# Patient Record
Sex: FEMALE | Race: WHITE | NOT HISPANIC OR LATINO | Employment: UNEMPLOYED | URBAN - NONMETROPOLITAN AREA
[De-identification: names, ages, dates, MRNs, and addresses within clinical notes are randomized per-mention and may not be internally consistent; named-entity substitution may affect disease eponyms.]

---

## 2024-07-22 PROBLEM — I50.22 CHRONIC SYSTOLIC HEART FAILURE: Status: ACTIVE | Noted: 2024-07-22

## 2024-07-22 PROBLEM — Z72.0 TOBACCO ABUSE: Status: ACTIVE | Noted: 2024-07-22

## 2024-07-22 PROBLEM — I27.20 PULMONARY HYPERTENSION: Status: ACTIVE | Noted: 2024-07-22

## 2024-07-24 ENCOUNTER — TELEPHONE (OUTPATIENT)
Dept: FAMILY MEDICINE CLINIC | Facility: CLINIC | Age: 34
End: 2024-07-24

## 2024-07-24 NOTE — TELEPHONE ENCOUNTER
Caller: ROSARIO WITH [employer]    Relationship:     Best call back number: [phone]     What is the medical concern/diagnosis: NURSING AND PHYSICAL THERAPY    What specialty or service is being requested:  HOME HEALTH    What is the provider, practice or medical service name:  [facility].     What is the office location:      What is the office phone number:  [phone]    Any additional details: PATIENT IS NOT IN THE CORRECT SERVICE AREA. PLEASE CONTACT ROSARIO IF PATIENT MOVES BACK TO THAT SERVICE AREA.       THANKS

## 2024-07-25 NOTE — TELEPHONE ENCOUNTER
HUB TO RELAY MESSAGE BELOW     If someone from [facility] calls, we are trying to reach Che to get documentation regarding her legal name, Krzysztof has her at [facility] and we have her as Frank, insurance states last name of Frank and ID is under [facility] so before orders can be signed by PCP we need documentation reflecting her legal last name.

## 2024-07-29 ENCOUNTER — PATIENT OUTREACH (OUTPATIENT)
Age: 34
End: 2024-07-29
Payer: MEDICAID

## 2024-07-29 NOTE — OUTREACH NOTE
SW received referral via PCP re: financial concerns, food insecurity, transportation, etc.  SW attempted to reach patient. No Answer. Number not in service, at this time. No emergency contact listed. Next outreach scheduled x 2 days.     Radha HARRY -   Ambulatory Case Management    7/29/2024, 13:18 EDT

## 2024-08-08 ENCOUNTER — PATIENT OUTREACH (OUTPATIENT)
Age: 34
End: 2024-08-08
Payer: MEDICAID

## 2024-08-08 NOTE — OUTREACH NOTE
SW received referral via PCP re: financial and food concerns. SW attempted to reach patient x3 with no success. SW to discharge as unable to reach patient. Please re consult SW if additional needs arise.     Radha HARRY -   Ambulatory Case Management    8/8/2024, 11:01 EDT

## 2024-08-13 ENCOUNTER — TELEPHONE (OUTPATIENT)
Dept: FAMILY MEDICINE CLINIC | Facility: CLINIC | Age: 34
End: 2024-08-13
Payer: MEDICAID

## 2024-08-13 DIAGNOSIS — S88.112D BELOW-KNEE AMPUTATION OF LEFT LOWER EXTREMITY, SUBSEQUENT ENCOUNTER: Primary | ICD-10-CM

## 2024-08-13 DIAGNOSIS — Z74.09 MOBILITY IMPAIRED: ICD-10-CM

## 2024-08-13 NOTE — TELEPHONE ENCOUNTER
Caller: [facility]    Relationship: Self    Best call back number: [phone]    Equipment requested: WHEELCHAIR    Reason for the request: PATIENT HAS AMPUTATION    Prescribing Provider: HESHAM MON    Additional information or concerns: PATIENT IS IN NEED OF A WHEELCHAIR.  THE BORROWED CHAIR THAT SHE IS USING THE WHEEL HAS FALLEN OFF AND IS IN NEED OF A NEW CHAIR THROUGH HER INSURANCE.

## 2024-08-14 ENCOUNTER — TELEPHONE (OUTPATIENT)
Dept: FAMILY MEDICINE CLINIC | Facility: CLINIC | Age: 34
End: 2024-08-14

## 2024-08-14 ENCOUNTER — TELEPHONE (OUTPATIENT)
Dept: FAMILY MEDICINE CLINIC | Facility: CLINIC | Age: 34
End: 2024-08-14
Payer: MEDICAID

## 2024-08-14 NOTE — TELEPHONE ENCOUNTER
"  Caller: OXANA BELTRÁN    Best call back number: [phone]    What orders are you requesting (i.e. lab or imaging): WHEELCHAIR    Where will you receive your lab/imaging services: [facility] IN [place]    FAX # [phone]    MOTHER IS VERY CONCERNED ABOUT [facility] STATING \"SHE HAS GIVEN UP\" DENIES HER BEING SUICIDAL. WOULD LIKE TO KNOW IF NURSES CAM CONTINUE TO REACH OUT TO HER ABOUT HOME HEALTH,    "

## 2024-08-14 NOTE — TELEPHONE ENCOUNTER
OFFICE CALLED [facility] WHERE THE ORDER WAS FAXED TO ON 07- AND SPOKE WITH EDGAR RUFFIN. SHE STATES THAT THE COMPANY HAS BEEN TRYING TO CALL PT BUT NO ANSWER, I ADVISE EDGAR TO CALL MOM OXANA BELTRÁN [phone] AND SCHEDULE DATE AND TIME FOR THE WHEEL CHAIR TO ARRIVE.  EDGAR VOICE UNDERSTOOD. THEN THE OFFICE CALLED MARIANA DAIGLE LETTING HER KNOW THAT [facility]ONE FROM [facility] WILL BE CALLING HER AND I GAVE HER THE DIRECT NUMBER TO [facility] [phone]. I TOLD OXANA TO CALL OFFICE BACK TO LET US KNOW WHAT IS GOING ON. SHE VOICE UNDERSTOOD. I TOLD HER THAT I WOULD HOLD OFF FAXING THIS ORDER TO [facility] IN [place] DIDN'T WANT A CONFLICT SINCE ITS BEEN FAXED TO [facility].

## 2024-08-14 NOTE — TELEPHONE ENCOUNTER
Spoke with mom I advise her to call around in [place] and see who has wheelchairs call the office back with company name and fax number and the office will fax order and office notes to that company.

## 2024-08-14 NOTE — TELEPHONE ENCOUNTER
Caller: OXANA    Relationship: Mother    Best call back number: [phone]     What was the call regarding: MOM WANTED PROVIDER TO KNOW THAT THE WHEELCHAIR IS BEING DELIVERED TOMORROW.

## 2024-08-19 ENCOUNTER — CONSULT (OUTPATIENT)
Dept: CARDIOLOGY | Facility: CLINIC | Age: 34
End: 2024-08-19
Payer: MEDICAID

## 2024-08-19 ENCOUNTER — TELEPHONE (OUTPATIENT)
Dept: CARDIOLOGY | Facility: CLINIC | Age: 34
End: 2024-08-19

## 2024-08-19 VITALS
HEIGHT: 64 IN | SYSTOLIC BLOOD PRESSURE: 90 MMHG | HEART RATE: 68 BPM | OXYGEN SATURATION: 98 % | DIASTOLIC BLOOD PRESSURE: 61 MMHG | WEIGHT: 170 LBS | BODY MASS INDEX: 29.02 KG/M2

## 2024-08-19 DIAGNOSIS — Z79.4 TYPE 2 DIABETES MELLITUS WITH FOOT ULCER, WITH LONG-TERM CURRENT USE OF INSULIN: Chronic | ICD-10-CM

## 2024-08-19 DIAGNOSIS — E11.621 TYPE 2 DIABETES MELLITUS WITH FOOT ULCER, WITH LONG-TERM CURRENT USE OF INSULIN: Chronic | ICD-10-CM

## 2024-08-19 DIAGNOSIS — L97.509 TYPE 2 DIABETES MELLITUS WITH FOOT ULCER, WITH LONG-TERM CURRENT USE OF INSULIN: Chronic | ICD-10-CM

## 2024-08-19 DIAGNOSIS — I34.0 NONRHEUMATIC MITRAL VALVE REGURGITATION: ICD-10-CM

## 2024-08-19 DIAGNOSIS — E78.2 MIXED HYPERLIPIDEMIA: ICD-10-CM

## 2024-08-19 DIAGNOSIS — I42.0 CARDIOMYOPATHY, DILATED: ICD-10-CM

## 2024-08-19 DIAGNOSIS — I95.0 IDIOPATHIC HYPOTENSION: ICD-10-CM

## 2024-08-19 PROCEDURE — 93000 ELECTROCARDIOGRAM COMPLETE: CPT | Performed by: INTERNAL MEDICINE

## 2024-08-19 PROCEDURE — 99204 OFFICE O/P NEW MOD 45 MIN: CPT | Performed by: INTERNAL MEDICINE

## 2024-08-19 RX ORDER — VARENICLINE TARTRATE 1 MG/1
1 TABLET, FILM COATED ORAL 2 TIMES DAILY
Qty: 56 TABLET | Refills: 1 | Status: SHIPPED | OUTPATIENT
Start: 2024-09-16 | End: 2024-11-11

## 2024-08-19 RX ORDER — VARENICLINE TARTRATE 0.5 (11)-1
KIT ORAL
Qty: 1 EACH | Refills: 0 | Status: SHIPPED | OUTPATIENT
Start: 2024-08-19 | End: 2024-09-16

## 2024-08-19 NOTE — PROGRESS NOTES
Cardiology Office Visit      Encounter Date:  08/19/2024    Patient ID:   Che Holden is a 34 y.o. female.    Reason For Followup:  Cardiomyopathy    Brief Clinical History:  Dear John Brown APRN    I had the pleasure of seeing Che Holden today. As you are well aware, this is a 34 y.o. female medical history that is significant for history of long history of diabetes mellitus history of diabetic foot ulcer requiring amputation history of peripheral vascular disease history of hypertension hyperlipidemia was recently diagnosed with a cardiomyopathy here to establish care for further evaluation and treatment options    Interval History:  Shortness of breath is better  Lower extremity edema has improved  Denies any chest pain  Patient continued to smoke  Multiple cardiovascular risk factors  History of diabetes mellitus hypertension hyperlipidemia not sure about prior cardiac workup  Poor functional status patient is currently wheelchair-bound      Assessment & Plan    Impressions:  Diabetes mellitus  Hypertension  Hyperlipidemia  Newly diagnosed cardiomyopathy  Multiple cardiovascular risk factors  Tobacco abuse  Poor historian    Recommendations:  advised to quit smoking  Continue current medical therapy with Toprol-XL Aldactone and Farxiga  Patient cannot tolerate angiotensin receptor blocker secondary to low blood pressure  Obtain medical records from prior hospitalization to see how much cardiac workup she has and what was the LV ejection fraction and also need for further ischemic evaluation at this point  Prior diagnosis and treatment options reviewed and discussed the patient   Is given a prescription for Chantix to quit smoking  Risk benefits and alternatives for Chantix reviewed and discussed with patient  Continue current medical therapy with the Farxiga 10 mg p.o. once a day Toprol-XL 25 mg p.o. once a day Aldactone 25 mg p.o. once a day  Recent labs and workup reviewed and discussed  "with patient  Follow-up in office in 3 months        Vitals:  Vitals:    08/19/24 1455   BP: 90/61   Pulse: 68   SpO2: 98%   Weight: 77.1 kg (170 lb)   Height: 162.6 cm (64\")       Physical Exam:    General: Alert, cooperative, no distress, appears stated age  Head:  Normocephalic, atraumatic, mucous membranes moist  Eyes:  Conjunctiva/corneas clear, EOM's intact     Neck:  Supple,  no adenopathy;      Lungs: Clear to auscultation bilaterally, no wheezes rhonchi rales are noted  Chest wall: No tenderness  Heart::  Regular rate and rhythm, S1 and S2 normal, no murmur, rub or gallop  Abdomen: Soft, non-tender, nondistended bowel sounds active  Extremities: No cyanosis, clubbing, or edema  Pulses: 2+ and symmetric all extremities  Skin:  No rashes or lesions  Neuro/psych: A&O x3. CN II through XII are grossly intact with appropriate affect              Lab Results   Component Value Date    GLUCOSE 177 (H) 07/22/2024    BUN 9 07/22/2024    CREATININE 0.68 07/22/2024    EGFR 115.9 07/22/2024    BCR 13.2 07/22/2024    K 4.0 07/22/2024    CO2 20.0 (L) 07/22/2024    CALCIUM 9.4 07/22/2024    ALBUMIN 3.9 07/22/2024    BILITOT 0.3 07/22/2024    AST 23 07/22/2024    ALT 17 07/22/2024        No results found for this or any previous visit.     Lab Results   Component Value Date    CHOL 207 (H) 07/22/2024    CHLPL 199 03/04/2020    TRIG 229 (H) 07/22/2024    HDL 38 (L) 07/22/2024     (H) 07/22/2024                Objective:          Allergies:  Allergies   Allergen Reactions    Bupropion Other (See Comments)     States that the first dose made her really angry       Medication Review:     Current Outpatient Medications:     Continuous Blood Gluc Sensor (FreeStyle Kale 3 Sensor) misc, Use 1 each Every 14 (Fourteen) Days., Disp: 2 each, Rfl: 3    dapagliflozin Propanediol 10 MG tablet, Take 10 mg by mouth Daily., Disp: 30 tablet, Rfl: 3    gabapentin (NEURONTIN) 800 MG tablet, Take 1 tablet by mouth 3 (Three) Times a Day., " Disp: 90 tablet, Rfl: 0    glipizide (GLUCOTROL XL) 5 MG ER tablet, Take 1 tablet by mouth Daily., Disp: 30 tablet, Rfl: 3    metoprolol succinate XL (TOPROL-XL) 25 MG 24 hr tablet, Take 1 tablet by mouth Daily., Disp: , Rfl:     spironolactone (ALDACTONE) 25 MG tablet, Take 1 tablet by mouth Daily., Disp: , Rfl:     Vortioxetine HBr (Trintellix) 10 MG tablet tablet, Take 1 tablet by mouth Daily With Breakfast., Disp: 30 tablet, Rfl: 3    busPIRone (BUSPAR) 5 MG tablet, Take 1 tablet by mouth 3 (Three) Times a Day. (Patient not taking: Reported on 8/19/2024), Disp: 90 tablet, Rfl: 1    Family History:  Family History   Problem Relation Age of Onset    COPD Mother     Hypertension Mother     Diabetes Father     Heart disease Sister     No Known Problems Sister     No Known Problems Sister     No Known Problems Brother     No Known Problems Maternal Grandmother     No Known Problems Maternal Grandfather     No Known Problems Paternal Grandmother     Diabetes Paternal Grandfather        Past Medical History:  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, with necrosis of muscle     Left    Headache     Hyperlipidemia     Neuropathy     Urinary tract infection        Past surgical History:  Past Surgical History:   Procedure Laterality Date    ABSCESS DRAINAGE      inner thigh    FOOT IRRIGATION, DEBRIDEMENT AND REPAIR Bilateral        Social History:  Social History     Socioeconomic History    Marital status: Single   Tobacco Use    Smoking status: Every Day     Average packs/day: 1 pack/day for 15.0 years (15.0 ttl pk-yrs)     Types: Cigarettes     Start date: 2005     Passive exposure: Current    Smokeless tobacco: Never    Tobacco comments:     no vaping/  Has  passive exp   Vaping Use    Vaping status: Never Used   Substance and Sexual Activity    Alcohol use: No    Drug use: No     Sexual activity: Not Currently       Review of Systems:  The following systems were reviewed as they relate to the cardiovascular system: Constitutional, Eyes, ENT, Cardiovascular, Respiratory, Gastrointestinal, Integumentary, Neurological, Psychiatric, Hematologic, Endocrine, Musculoskeletal, and Genitourinary. The pertinent cardiovascular findings are reported above with all other cardiovascular points within those systems being negative.    Diagnostic Study Review:     Current Electrocardiogram:    ECG 12 Lead    Date/Time: 8/19/2024 3:04 PM  Performed by: Zara De Leon MD    Authorized by: Zara De Leon MD  Comparison: compared with previous ECG   Similar to previous ECG  Rhythm: sinus rhythm  Rate: normal  BPM: 96  Conduction: conduction normal  QRS axis: normal  Other findings: non-specific ST-T wave changes    Clinical impression: abnormal EKG                NOTE: The following portions of the patient's history were reviewed and updated this visit as appropriate: allergies, current medications, past family history, past medical history, past social history, past surgical history and problem list.

## 2024-08-30 ENCOUNTER — OFFICE VISIT (OUTPATIENT)
Dept: FAMILY MEDICINE CLINIC | Facility: CLINIC | Age: 34
End: 2024-08-30
Payer: MEDICAID

## 2024-08-30 VITALS
WEIGHT: 186 LBS | HEIGHT: 64 IN | SYSTOLIC BLOOD PRESSURE: 90 MMHG | DIASTOLIC BLOOD PRESSURE: 60 MMHG | OXYGEN SATURATION: 98 % | HEART RATE: 87 BPM | TEMPERATURE: 98.7 F | BODY MASS INDEX: 31.76 KG/M2 | RESPIRATION RATE: 18 BRPM

## 2024-08-30 DIAGNOSIS — E11.621 TYPE 2 DIABETES MELLITUS WITH FOOT ULCER, WITH LONG-TERM CURRENT USE OF INSULIN: Chronic | ICD-10-CM

## 2024-08-30 DIAGNOSIS — Z79.4 TYPE 2 DIABETES MELLITUS WITH FOOT ULCER, WITH LONG-TERM CURRENT USE OF INSULIN: Chronic | ICD-10-CM

## 2024-08-30 DIAGNOSIS — R19.7 DIARRHEA, UNSPECIFIED TYPE: Primary | ICD-10-CM

## 2024-08-30 DIAGNOSIS — E08.621 DIABETIC ULCER OF RIGHT MIDFOOT ASSOCIATED WITH DIABETES MELLITUS DUE TO UNDERLYING CONDITION, LIMITED TO BREAKDOWN OF SKIN: ICD-10-CM

## 2024-08-30 DIAGNOSIS — L97.509 TYPE 2 DIABETES MELLITUS WITH FOOT ULCER, WITH LONG-TERM CURRENT USE OF INSULIN: Chronic | ICD-10-CM

## 2024-08-30 DIAGNOSIS — R79.89 ELEVATED BRAIN NATRIURETIC PEPTIDE (BNP) LEVEL: ICD-10-CM

## 2024-08-30 DIAGNOSIS — L97.411 DIABETIC ULCER OF RIGHT MIDFOOT ASSOCIATED WITH DIABETES MELLITUS DUE TO UNDERLYING CONDITION, LIMITED TO BREAKDOWN OF SKIN: ICD-10-CM

## 2024-08-30 PROCEDURE — 1126F AMNT PAIN NOTED NONE PRSNT: CPT | Performed by: REGISTERED NURSE

## 2024-08-30 PROCEDURE — 1159F MED LIST DOCD IN RCRD: CPT | Performed by: REGISTERED NURSE

## 2024-08-30 PROCEDURE — 99215 OFFICE O/P EST HI 40 MIN: CPT | Performed by: REGISTERED NURSE

## 2024-08-30 PROCEDURE — 3044F HG A1C LEVEL LT 7.0%: CPT | Performed by: REGISTERED NURSE

## 2024-08-30 PROCEDURE — T1015 CLINIC SERVICE: HCPCS | Performed by: REGISTERED NURSE

## 2024-08-30 PROCEDURE — 1160F RVW MEDS BY RX/DR IN RCRD: CPT | Performed by: REGISTERED NURSE

## 2024-08-30 RX ORDER — GLIPIZIDE 5 MG/1
5 TABLET, FILM COATED, EXTENDED RELEASE ORAL DAILY
Qty: 30 TABLET | Refills: 3 | Status: SHIPPED | OUTPATIENT
Start: 2024-08-30

## 2024-09-07 DIAGNOSIS — Z79.4 TYPE 2 DIABETES MELLITUS WITH FOOT ULCER, WITH LONG-TERM CURRENT USE OF INSULIN: Chronic | ICD-10-CM

## 2024-09-07 DIAGNOSIS — E11.621 TYPE 2 DIABETES MELLITUS WITH FOOT ULCER, WITH LONG-TERM CURRENT USE OF INSULIN: Chronic | ICD-10-CM

## 2024-09-07 DIAGNOSIS — L97.509 TYPE 2 DIABETES MELLITUS WITH FOOT ULCER, WITH LONG-TERM CURRENT USE OF INSULIN: Chronic | ICD-10-CM

## 2024-09-09 RX ORDER — BLOOD-GLUCOSE SENSOR
EACH MISCELLANEOUS
Qty: 2 EACH | Refills: 0 | Status: SHIPPED | OUTPATIENT
Start: 2024-09-09

## 2024-09-14 DIAGNOSIS — L97.509 TYPE 2 DIABETES MELLITUS WITH FOOT ULCER, WITH LONG-TERM CURRENT USE OF INSULIN: Chronic | ICD-10-CM

## 2024-09-14 DIAGNOSIS — Z79.4 TYPE 2 DIABETES MELLITUS WITH FOOT ULCER, WITH LONG-TERM CURRENT USE OF INSULIN: Chronic | ICD-10-CM

## 2024-09-14 DIAGNOSIS — E11.621 TYPE 2 DIABETES MELLITUS WITH FOOT ULCER, WITH LONG-TERM CURRENT USE OF INSULIN: Chronic | ICD-10-CM

## 2024-09-17 RX ORDER — GABAPENTIN 800 MG/1
800 TABLET ORAL 3 TIMES DAILY
Qty: 90 TABLET | Refills: 2 | Status: SHIPPED | OUTPATIENT
Start: 2024-09-17

## 2024-12-11 DIAGNOSIS — L97.509 TYPE 2 DIABETES MELLITUS WITH FOOT ULCER, WITH LONG-TERM CURRENT USE OF INSULIN: Chronic | ICD-10-CM

## 2024-12-11 DIAGNOSIS — Z79.4 TYPE 2 DIABETES MELLITUS WITH FOOT ULCER, WITH LONG-TERM CURRENT USE OF INSULIN: Chronic | ICD-10-CM

## 2024-12-11 DIAGNOSIS — E11.621 TYPE 2 DIABETES MELLITUS WITH FOOT ULCER, WITH LONG-TERM CURRENT USE OF INSULIN: Chronic | ICD-10-CM

## 2024-12-11 NOTE — TELEPHONE ENCOUNTER
Caller: Che Holden    Relationship: Self    Best call back number: [phone]     Requested Prescriptions:   Requested Prescriptions     Pending Prescriptions Disp Refills    gabapentin (NEURONTIN) 800 MG tablet 90 tablet 2     Sig: Take 1 tablet by mouth 3 (Three) Times a Day.        Pharmacy where request should be sent: [address] [phone] [facility] [phone]      Last office visit with prescribing clinician: 8/30/2024   Last telemedicine visit with prescribing clinician: 6/14/2024   Next office visit with prescribing clinician: Visit date not found     Additional details provided by patient:     Does the patient have less than a 3 day supply:  [] Yes  [x] No    Would you like a call back once the refill request has been completed: [] Yes [] No    If the office needs to give you a call back, can they leave a voicemail: [] Yes [] No    Familia Arthur Rep   12/11/24 16:17 EST

## 2024-12-12 NOTE — TELEPHONE ENCOUNTER
Rx Refill Note  Requested Prescriptions     Pending Prescriptions Disp Refills    gabapentin (NEURONTIN) 800 MG tablet 90 tablet 2     Sig: Take 1 tablet by mouth 3 (Three) Times a Day.      Last office visit with prescribing clinician: 8/30/2024   Last telemedicine visit with prescribing clinician: 6/14/2024   Next office visit with prescribing clinician: Visit date not found       UDS     None on file  CSA     None on file    INSPECT - SCAN - INSPECT/ [facility] [place]/ 12/12/2024 (12/12/2024)       Annamarie Rodriguez MA  12/12/24, 09:52 EST

## 2024-12-14 DIAGNOSIS — L97.509 TYPE 2 DIABETES MELLITUS WITH FOOT ULCER, WITH LONG-TERM CURRENT USE OF INSULIN: Chronic | ICD-10-CM

## 2024-12-14 DIAGNOSIS — Z79.4 TYPE 2 DIABETES MELLITUS WITH FOOT ULCER, WITH LONG-TERM CURRENT USE OF INSULIN: Chronic | ICD-10-CM

## 2024-12-14 DIAGNOSIS — E11.621 TYPE 2 DIABETES MELLITUS WITH FOOT ULCER, WITH LONG-TERM CURRENT USE OF INSULIN: Chronic | ICD-10-CM

## 2024-12-16 DIAGNOSIS — E11.621 TYPE 2 DIABETES MELLITUS WITH FOOT ULCER, WITH LONG-TERM CURRENT USE OF INSULIN: Chronic | ICD-10-CM

## 2024-12-16 DIAGNOSIS — Z79.4 TYPE 2 DIABETES MELLITUS WITH FOOT ULCER, WITH LONG-TERM CURRENT USE OF INSULIN: Chronic | ICD-10-CM

## 2024-12-16 DIAGNOSIS — L97.509 TYPE 2 DIABETES MELLITUS WITH FOOT ULCER, WITH LONG-TERM CURRENT USE OF INSULIN: Chronic | ICD-10-CM

## 2024-12-16 RX ORDER — GABAPENTIN 800 MG/1
800 TABLET ORAL 3 TIMES DAILY
Qty: 90 TABLET | Refills: 0 | OUTPATIENT
Start: 2024-12-16

## 2024-12-16 RX ORDER — GABAPENTIN 800 MG/1
800 TABLET ORAL 3 TIMES DAILY
Qty: 90 TABLET | Refills: 2 | OUTPATIENT
Start: 2024-12-16

## 2024-12-16 NOTE — TELEPHONE ENCOUNTER
MARINO IS INSTRUCTED TO RELAY BELOW MESSAGE FROM LELIA      IF PT CALLS BACK    Schedule patient for an appointment within the next 4 weeks and let the office know. She no-showed the last couple appointments and gabapentin is controlled. She cannot be getting refills of gabapentin unless she is faithfully coming to appointments. Thank you

## 2024-12-16 NOTE — TELEPHONE ENCOUNTER
Name: Che Holden JOAQUÍN    Relationship: Self    Best Callback Number: [phone]    HUB PROVIDED THE RELAY MESSAGE FROM THE OFFICE   PATIENT SCHEDULED AS REQUESTED    ADDITIONAL INFORMATION:

## 2024-12-16 NOTE — TELEPHONE ENCOUNTER
Schedule patient for an appointment within the next 4 weeks and let the office know. She no-showed the last couple appointments and gabapentin is controlled. She cannot be getting refills of gabapentin unless she is faithfully coming to appointments. Thank you    interrupted

## 2024-12-16 NOTE — TELEPHONE ENCOUNTER
Schedule patient for an appointment within the next 4 weeks and let the office know. She no-showed the last couple appointments and gabapentin is controlled. She cannot be getting refills of gabapentin unless she is faithfully coming to appointments. Thank you

## 2025-01-08 DIAGNOSIS — L97.509 TYPE 2 DIABETES MELLITUS WITH FOOT ULCER, WITH LONG-TERM CURRENT USE OF INSULIN: Chronic | ICD-10-CM

## 2025-01-08 DIAGNOSIS — Z79.4 TYPE 2 DIABETES MELLITUS WITH FOOT ULCER, WITH LONG-TERM CURRENT USE OF INSULIN: Chronic | ICD-10-CM

## 2025-01-08 DIAGNOSIS — E11.621 TYPE 2 DIABETES MELLITUS WITH FOOT ULCER, WITH LONG-TERM CURRENT USE OF INSULIN: Chronic | ICD-10-CM

## 2025-01-08 DIAGNOSIS — F33.2 SEVERE EPISODE OF RECURRENT MAJOR DEPRESSIVE DISORDER, WITHOUT PSYCHOTIC FEATURES: ICD-10-CM

## 2025-01-08 RX ORDER — GLIPIZIDE 5 MG/1
5 TABLET, FILM COATED, EXTENDED RELEASE ORAL DAILY
Qty: 30 TABLET | Refills: 0 | Status: SHIPPED | OUTPATIENT
Start: 2025-01-08

## 2025-01-08 RX ORDER — VORTIOXETINE 10 MG/1
10 TABLET, FILM COATED ORAL
Qty: 30 TABLET | Refills: 0 | Status: SHIPPED | OUTPATIENT
Start: 2025-01-08 | End: 2025-01-13 | Stop reason: DRUGHIGH

## 2025-01-28 ENCOUNTER — TELEPHONE (OUTPATIENT)
Dept: FAMILY MEDICINE CLINIC | Facility: CLINIC | Age: 35
End: 2025-01-28

## 2025-01-28 NOTE — TELEPHONE ENCOUNTER
HUB to relay description below.    LVM FOR PT TO CALL OFFICE AND RS SLOANE THAT'S ON FOR 1-28-25 SCHEDULE NEXT AVAILABLE WITH LELIA THANK YOU

## 2025-02-05 ENCOUNTER — OFFICE VISIT (OUTPATIENT)
Dept: FAMILY MEDICINE CLINIC | Facility: CLINIC | Age: 35
End: 2025-02-05
Payer: MEDICAID

## 2025-02-05 ENCOUNTER — TELEPHONE (OUTPATIENT)
Dept: FAMILY MEDICINE CLINIC | Facility: CLINIC | Age: 35
End: 2025-02-05

## 2025-02-05 VITALS
DIASTOLIC BLOOD PRESSURE: 80 MMHG | HEIGHT: 64 IN | SYSTOLIC BLOOD PRESSURE: 116 MMHG | HEART RATE: 93 BPM | OXYGEN SATURATION: 100 % | WEIGHT: 186 LBS | BODY MASS INDEX: 31.76 KG/M2

## 2025-02-05 DIAGNOSIS — Z89.431 PARTIAL NONTRAUMATIC AMPUTATION OF FOOT, RIGHT: ICD-10-CM

## 2025-02-05 DIAGNOSIS — Z09 HOSPITAL DISCHARGE FOLLOW-UP: Primary | ICD-10-CM

## 2025-02-05 DIAGNOSIS — E11.59 TYPE 2 DIABETES MELLITUS WITH OTHER CIRCULATORY COMPLICATION, WITH LONG-TERM CURRENT USE OF INSULIN: ICD-10-CM

## 2025-02-05 DIAGNOSIS — S91.301A WOUND OF RIGHT FOOT: ICD-10-CM

## 2025-02-05 DIAGNOSIS — Z79.4 TYPE 2 DIABETES MELLITUS WITH OTHER CIRCULATORY COMPLICATION, WITH LONG-TERM CURRENT USE OF INSULIN: ICD-10-CM

## 2025-02-05 DIAGNOSIS — Z22.322 MRSA (METHICILLIN RESISTANT STAPH AUREUS) CULTURE POSITIVE: ICD-10-CM

## 2025-02-05 LAB
BASOPHILS # BLD AUTO: 0.02 10*3/MM3 (ref 0–0.2)
BASOPHILS NFR BLD AUTO: 0.2 % (ref 0–1.5)
DEPRECATED RDW RBC AUTO: 48.6 FL (ref 37–54)
EOSINOPHIL # BLD AUTO: 0.09 10*3/MM3 (ref 0–0.4)
EOSINOPHIL NFR BLD AUTO: 0.8 % (ref 0.3–6.2)
ERYTHROCYTE [DISTWIDTH] IN BLOOD BY AUTOMATED COUNT: 15.5 % (ref 12.3–15.4)
HBA1C MFR BLD: 7.7 % (ref 4.8–5.6)
HCT VFR BLD AUTO: 35.7 % (ref 34–46.6)
HGB BLD-MCNC: 11.8 G/DL (ref 12–15.9)
IMM GRANULOCYTES # BLD AUTO: 0.06 10*3/MM3 (ref 0–0.05)
IMM GRANULOCYTES NFR BLD AUTO: 0.5 % (ref 0–0.5)
LYMPHOCYTES # BLD AUTO: 2.44 10*3/MM3 (ref 0.7–3.1)
LYMPHOCYTES NFR BLD AUTO: 20.9 % (ref 19.6–45.3)
MCH RBC QN AUTO: 28.5 PG (ref 26.6–33)
MCHC RBC AUTO-ENTMCNC: 33.1 G/DL (ref 31.5–35.7)
MCV RBC AUTO: 86.2 FL (ref 79–97)
MONOCYTES # BLD AUTO: 0.6 10*3/MM3 (ref 0.1–0.9)
MONOCYTES NFR BLD AUTO: 5.1 % (ref 5–12)
NEUTROPHILS NFR BLD AUTO: 72.5 % (ref 42.7–76)
NEUTROPHILS NFR BLD AUTO: 8.45 10*3/MM3 (ref 1.7–7)
NRBC BLD AUTO-RTO: 0 /100 WBC (ref 0–0.2)
PLATELET # BLD AUTO: 369 10*3/MM3 (ref 140–450)
PMV BLD AUTO: 10.5 FL (ref 6–12)
RBC # BLD AUTO: 4.14 10*6/MM3 (ref 3.77–5.28)
WBC NRBC COR # BLD AUTO: 11.66 10*3/MM3 (ref 3.4–10.8)

## 2025-02-05 PROCEDURE — 99215 OFFICE O/P EST HI 40 MIN: CPT | Performed by: REGISTERED NURSE

## 2025-02-05 PROCEDURE — 80053 COMPREHEN METABOLIC PANEL: CPT | Performed by: REGISTERED NURSE

## 2025-02-05 PROCEDURE — 1125F AMNT PAIN NOTED PAIN PRSNT: CPT | Performed by: REGISTERED NURSE

## 2025-02-05 PROCEDURE — 85025 COMPLETE CBC W/AUTO DIFF WBC: CPT | Performed by: REGISTERED NURSE

## 2025-02-05 PROCEDURE — T1015 CLINIC SERVICE: HCPCS | Performed by: REGISTERED NURSE

## 2025-02-05 PROCEDURE — 83036 HEMOGLOBIN GLYCOSYLATED A1C: CPT | Performed by: REGISTERED NURSE

## 2025-02-05 PROCEDURE — 1160F RVW MEDS BY RX/DR IN RCRD: CPT | Performed by: REGISTERED NURSE

## 2025-02-05 PROCEDURE — 1159F MED LIST DOCD IN RCRD: CPT | Performed by: REGISTERED NURSE

## 2025-02-05 NOTE — PROGRESS NOTES
Chief Complaint  Med Refill    Subjective    History of Present Illness {CC  Problem List  Visit  Diagnosis   Encounters  Notes  Medications  Labs  Result Review Imaging  Media :23}     Che Holden presents to [facility] for Med Refill.      History of Present Illness  Patient is a 34 y.o. female who presents to the clinic today for 2-week hospital follow-up for osteomyelitis.  Patient denies any chest pain, shortness of breath, or any fevers.  Patient denies any known exposure to COVID, flu, or any other contagious illnesses.       History of Present Illness  In regards to hospital follow-up, she was recently hospitalized at [facility] [place] from January 14, 2025 through January 23, 2025 for a period of 9 days.  During which she underwent 2 incision and drainage procedures on her right foot to manage an osteomyelitis and MRSA infection. She received several rounds of vancomycin and other antibiotics during her stay. Upon discharge, she was advised to transition to a nursing home for 6 weeks of IV antibiotics, a recommendation she declined. Her wound VAC was removed prior to her discharge from the hospital. She has been managing her condition at home and had requested home care but was refused by hospital staff to offer home care, stating that they felt she was not a good candidate and wanted her to go to a rehab facility.  She is currently seeking home health services and requires antibiotics.  Her hospital stay wound culture revealed that she has MRSA, Klebsiella oxytocin, and Streptococcus dysgalactiae had required vancomycin and cefepime IV antibiotics.  She has expressed a preference for home-based care over nursing home admission. She has been informed that her leg amputation surgery will be scheduled after the completion of her antibiotic course pending that she continues to heal well. She does not have an infectious disease outpatient provider at present but does  require a referral to infectious disease so that she can continue the IV antibiotics outpatient.     She is also experiencing transportation difficulties, which have resulted in missed appointments. She has requested virtual consultations as an alternative to in-person visits. She is uncertain about the timing for staple removal from her wound and has requested guidance on this matter.  I advised patient that she needs to return to her surgeon for this advice.  I would first call the surgical team and then return in person for a visit if needed, this is something outside of primary care scope of practice.      She has declined the influenza vaccine at this time.    She has expressed a need for a diabetic eye examination, having not undergone one in several years.  She wants to defer this at this time but seeks to make an appointment with her provider within the next couple months she shares.    MEDICATIONS  gabapentin    IMMUNIZATIONS  She declined the influenza vaccine.       Review of Systems   Constitutional: Negative.  Negative for activity change, chills, fatigue and fever.   HENT: Negative.  Negative for congestion, dental problem, ear pain, hearing loss, rhinorrhea, sinus pain, sore throat, tinnitus and trouble swallowing.    Eyes: Negative.  Negative for pain and visual disturbance.   Respiratory: Negative.  Negative for cough, chest tightness, shortness of breath and wheezing.    Cardiovascular: Negative.  Negative for chest pain, palpitations and leg swelling.   Gastrointestinal: Negative.  Negative for abdominal pain, diarrhea, nausea and vomiting.   Endocrine: Negative.  Negative for polydipsia, polyphagia and polyuria.   Genitourinary: Negative.  Negative for difficulty urinating, dysuria, frequency and urgency.   Musculoskeletal: Negative.  Negative for arthralgias, back pain and myalgias.   Skin: Negative.  Positive for wound (Right lower extremity wounds). Negative for color change, pallor and rash.  "  Allergic/Immunologic: Negative.  Negative for environmental allergies.   Neurological: Negative.  Negative for dizziness, speech difficulty, weakness, light-headedness, numbness and headaches.   Hematological: Negative.    Psychiatric/Behavioral: Negative.  Negative for confusion, decreased concentration, self-injury and suicidal ideas. The patient is not nervous/anxious.    All other systems reviewed and are negative.       Objective     Vital Signs:   /80   Pulse 93   Ht 162.6 cm (64.02\")   Wt 84.4 kg (186 lb)   SpO2 100%   BMI 31.91 kg/m²   Current Outpatient Medications on File Prior to Visit   Medication Sig Dispense Refill    Continuous Glucose Sensor (FreeStyle Kale 3 Sensor) misc USE AS DIRECTED AND CHANGE SENSOR EVERY 14 DAYS 2 each 0    dapagliflozin Propanediol 10 MG tablet Take 10 mg by mouth Daily. 30 tablet 3    gabapentin (NEURONTIN) 800 MG tablet Take 1 tablet by mouth 3 (Three) Times a Day. 90 tablet 0    glipizide (GLUCOTROL XL) 5 MG ER tablet Take 1 tablet by mouth once daily 30 tablet 0    metoprolol succinate XL (TOPROL-XL) 25 MG 24 hr tablet Take 1 tablet by mouth Daily.      spironolactone (ALDACTONE) 25 MG tablet Take 1 tablet by mouth Daily.      Vortioxetine HBr (Trintellix) 20 MG tablet Take 1 tablet by mouth Daily With Breakfast. 30 tablet 1    busPIRone (BUSPAR) 5 MG tablet Take 1 tablet by mouth 3 (Three) Times a Day. (Patient not taking: Reported on 2/5/2025) 90 tablet 1     No current facility-administered medications on file prior to visit.        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, with necrosis of muscle     Left    Headache     Hyperlipidemia     Myocardial infarction     Neuropathy     Urinary tract infection       Past Surgical History:   Procedure Laterality Date    ABSCESS DRAINAGE      inner thigh    FOOT IRRIGATION, " DEBRIDEMENT AND REPAIR Bilateral       Family History   Problem Relation Age of Onset    COPD Mother     Hypertension Mother     Diabetes Father     Heart disease Sister     No Known Problems Sister     No Known Problems Sister     No Known Problems Brother     No Known Problems Maternal Grandmother     No Known Problems Maternal Grandfather     No Known Problems Paternal Grandmother     Diabetes Paternal Grandfather       Social History     Socioeconomic History    Marital status: Single   Tobacco Use    Smoking status: Every Day     Average packs/day: 1 pack/day for 15.0 years (15.0 ttl pk-yrs)     Types: Cigarettes     Start date: 2005     Passive exposure: Current    Smokeless tobacco: Never    Tobacco comments:     no vaping/  Has  passive exp   Vaping Use    Vaping status: Never Used   Substance and Sexual Activity    Alcohol use: No    Drug use: No    Sexual activity: Not Currently         No visits with results within 3 Month(s) from this visit.   Latest known visit with results is:   Office Visit on 07/22/2024   Component Date Value Ref Range Status    Glucose 07/22/2024 177 (H)  65 - 99 mg/dL Final    BUN 07/22/2024 9  6 - 20 mg/dL Final    Creatinine 07/22/2024 0.68  0.57 - 1.00 mg/dL Final    Sodium 07/22/2024 132 (L)  136 - 145 mmol/L Final    Potassium 07/22/2024 4.0  3.5 - 5.2 mmol/L Final    Chloride 07/22/2024 102  98 - 107 mmol/L Final    CO2 07/22/2024 20.0 (L)  22.0 - 29.0 mmol/L Final    Calcium 07/22/2024 9.4  8.6 - 10.5 mg/dL Final    Total Protein 07/22/2024 8.0  6.0 - 8.5 g/dL Final    Albumin 07/22/2024 3.9  3.5 - 5.2 g/dL Final    ALT (SGPT) 07/22/2024 17  1 - 33 U/L Final    AST (SGOT) 07/22/2024 23  1 - 32 U/L Final    Alkaline Phosphatase 07/22/2024 85  39 - 117 U/L Final    Total Bilirubin 07/22/2024 0.3  0.0 - 1.2 mg/dL Final    Globulin 07/22/2024 4.1  gm/dL Final    A/G Ratio 07/22/2024 1.0  g/dL Final    BUN/Creatinine Ratio 07/22/2024 13.2  7.0 - 25.0 Final    Anion Gap 07/22/2024  10.0  5.0 - 15.0 mmol/L Final    eGFR 07/22/2024 115.9  >60.0 mL/min/1.73 Final    Hemoglobin A1C 07/22/2024 6.50 (H)  4.80 - 5.60 % Final    Total Cholesterol 07/22/2024 207 (H)  0 - 200 mg/dL Final    Triglycerides 07/22/2024 229 (H)  0 - 150 mg/dL Final    HDL Cholesterol 07/22/2024 38 (L)  40 - 60 mg/dL Final    LDL Cholesterol  07/22/2024 128 (H)  0 - 100 mg/dL Final    VLDL Cholesterol 07/22/2024 41 (H)  5 - 40 mg/dL Final    LDL/HDL Ratio 07/22/2024 3.24   Final    proBNP 07/22/2024 1,569.0 (H)  0.0 - 450.0 pg/mL Final    WBC 07/22/2024 7.22  3.40 - 10.80 10*3/mm3 Final    RBC 07/22/2024 4.73  3.77 - 5.28 10*6/mm3 Final    Hemoglobin 07/22/2024 13.0  12.0 - 15.9 g/dL Final    Hematocrit 07/22/2024 40.2  34.0 - 46.6 % Final    MCV 07/22/2024 85.0  79.0 - 97.0 fL Final    MCH 07/22/2024 27.5  26.6 - 33.0 pg Final    MCHC 07/22/2024 32.3  31.5 - 35.7 g/dL Final    RDW 07/22/2024 18.6 (H)  12.3 - 15.4 % Final    RDW-SD 07/22/2024 57.9 (H)  37.0 - 54.0 fl Final    MPV 07/22/2024 11.3  6.0 - 12.0 fL Final    Platelets 07/22/2024 341  140 - 450 10*3/mm3 Final    Neutrophil % 07/22/2024 65.4  42.7 - 76.0 % Final    Lymphocyte % 07/22/2024 23.4  19.6 - 45.3 % Final    Monocyte % 07/22/2024 10.2  5.0 - 12.0 % Final    Eosinophil % 07/22/2024 0.6  0.3 - 6.2 % Final    Basophil % 07/22/2024 0.1  0.0 - 1.5 % Final    Immature Grans % 07/22/2024 0.3  0.0 - 0.5 % Final    Neutrophils, Absolute 07/22/2024 4.72  1.70 - 7.00 10*3/mm3 Final    Lymphocytes, Absolute 07/22/2024 1.69  0.70 - 3.10 10*3/mm3 Final    Monocytes, Absolute 07/22/2024 0.74  0.10 - 0.90 10*3/mm3 Final    Eosinophils, Absolute 07/22/2024 0.04  0.00 - 0.40 10*3/mm3 Final    Basophils, Absolute 07/22/2024 0.01  0.00 - 0.20 10*3/mm3 Final    Immature Grans, Absolute 07/22/2024 0.02  0.00 - 0.05 10*3/mm3 Final    nRBC 07/22/2024 0.0  0.0 - 0.2 /100 WBC Final         Physical Exam  Vitals and nursing note reviewed.   Constitutional:       Appearance: Normal  appearance. She is normal weight.   HENT:      Head: Normocephalic and atraumatic.   Cardiovascular:      Rate and Rhythm: Normal rate and regular rhythm.      Pulses: Normal pulses.      Heart sounds: Normal heart sounds. No murmur heard.     No friction rub. No gallop.   Pulmonary:      Effort: Pulmonary effort is normal. No respiratory distress.      Breath sounds: Normal breath sounds. No stridor. No wheezing, rhonchi or rales.   Chest:      Chest wall: No tenderness.   Abdominal:      General: Abdomen is flat. Bowel sounds are normal. There is no distension.      Palpations: Abdomen is soft. There is no mass.      Tenderness: There is no abdominal tenderness. There is no right CVA tenderness, left CVA tenderness, guarding or rebound.      Hernia: No hernia is present.   Skin:     General: Skin is warm and dry.      Capillary Refill: Capillary refill takes less than 2 seconds.      Coloration: Skin is not jaundiced or pale.      Findings: Wound present.      Comments: Left lower extremity wounds   Neurological:      General: No focal deficit present.      Mental Status: She is alert and oriented to person, place, and time. Mental status is at baseline.      Motor: No weakness.      Coordination: Coordination normal.      Gait: Gait normal.   Psychiatric:         Mood and Affect: Mood normal.         Behavior: Behavior normal.         Thought Content: Thought content normal.         Judgment: Judgment normal.          Physical Exam         Result Review  Data Reviewed:{ Labs  Result Review  Imaging  Med Tab  Media :23}   I have reviewed this patient's chart.  I have reviewed previous labs, previous imaging, previous medications, and previous encounters with notes that were available in this patient's chart.    Results                Assessment and Plan {CC Problem List  Visit Diagnosis  ROS  Review (Popup)  [facility] Maintenance  Quality  BestPractice  Medications  SmartSets  SnapShot Encounters   Media :23}   Diagnoses and all orders for this visit:    1. Hospital discharge follow-up (Primary)    2. Type 2 diabetes mellitus with other circulatory complication, with long-term current use of insulin  -     Ambulatory Referral to Home Health  -     Ambulatory Referral to Wound Clinic  -     CBC & Differential  -     Comprehensive Metabolic Panel  -     Hemoglobin A1c    3. Partial nontraumatic amputation of foot, right  -     Ambulatory Referral to Wound Clinic  -     Cancel: Ambulatory Referral to Infectious Disease  -     Ambulatory Referral to Infectious Disease    4. Wound of right foot  -     Ambulatory Referral to Wound Clinic  -     Cancel: Ambulatory Referral to Infectious Disease  -     CBC & Differential  -     Comprehensive Metabolic Panel  -     Ambulatory Referral to Infectious Disease    5. MRSA (methicillin resistant staph aureus) culture positive  -     Cancel: Ambulatory Referral to Infectious Disease  -     CBC & Differential  -     Comprehensive Metabolic Panel  -     Ambulatory Referral to Infectious Disease        Assessment & Plan  1. Right foot wound.  The wound is complicated by 3 distinct infections. She has been advised to consult her surgeon regarding the sutures. She has been instructed to maintain hygiene by using soap and water for wound care and Clorox for bandage care, allowing a kill time of 2 to 3 minutes to prevent infection spread. She has been informed that amputation surgery will be scheduled post-antibiotic course completion and prosthetic fitting readiness. She has been advised to seek immediate medical attention at the hospital if she is unable to contact her surgeon. A home health order will be placed today, including skilled nursing, physical therapy, occupational therapy, medical social work, and wound care. A stat referral to infectious disease will be made for antibiotic continuation. A referral to the wound clinic will also be made. She will be contacted by all 3  services. Laboratory tests, including CBC, CMP, and A1c, will be ordered.    2. Medication management.  She has been advised to request a refill via [employer].    3. Diabetes mellitus.  She has been advised to schedule a diabetic eye exam.    PROCEDURE  The patient underwent 2 incision and drainage procedures on her right foot during a recent 9-day hospitalization.         -ER red flags discussed with patient including risk versus benefit and education provided.  -Follow-up with me in 2 weeks or sooner if needed.    I spent 40 minutes caring for Che on this date of service. This time includes time spent by me in the following activities:preparing for the visit, reviewing tests, obtaining and/or reviewing a separately obtained history, performing a medically appropriate examination and/or evaluation , counseling and educating the patient/family/caregiver, ordering medications, tests, or procedures, referring and communicating with other health care professionals , documenting information in the medical record, independently interpreting results and communicating that information with the patient/family/caregiver, and care coordination.    Follow Up {Instructions Charge Capture  Follow-up Communications :23}     Patient was given instructions and counseling regarding her condition or for health maintenance advice. Please see specific information pulled into the AVS (placed there by myself) if appropriate.    Return in about 2 weeks (around 2/19/2025) for Recheck.    BMI is >= 30 and <35. (Class 1 Obesity). The following options were offered after discussion;: exercise counseling/recommendations and nutrition counseling/recommendations         WAI Logan, [facility]      Patient or patient representative verbalized consent for the use of Ambient Listening during the visit with  WAI Logan for chart documentation. 2/5/2025  15:30 EST

## 2025-02-05 NOTE — TELEPHONE ENCOUNTER
Caller: SHAYLA    Relationship: Home Health    Best call back number: [phone]    SHAYLA CALLED WITH [facility], REGARDING THE REFERRAL THEY RECEIVED ON THIS PATIENT.    THE PATIENT'S MEDICAID [facility]WORK [facility]. THEY DO NOT ACCEPT IT AND HAVE TO DENY THE REFERRAL.

## 2025-02-05 NOTE — PROGRESS NOTES
Venipuncture Blood Specimen Collection  Venipuncture performed in RT ARM by Familia Chacon with good hemostasis. Patient tolerated the procedure well without complications.   02/05/25   Familia Chacon

## 2025-02-06 LAB
ALBUMIN SERPL-MCNC: 3.3 G/DL (ref 3.5–5.2)
ALBUMIN/GLOB SERPL: 0.7 G/DL
ALP SERPL-CCNC: 91 U/L (ref 39–117)
ALT SERPL W P-5'-P-CCNC: 15 U/L (ref 1–33)
ANION GAP SERPL CALCULATED.3IONS-SCNC: 8.5 MMOL/L (ref 5–15)
AST SERPL-CCNC: 13 U/L (ref 1–32)
BILIRUB SERPL-MCNC: 0.3 MG/DL (ref 0–1.2)
BUN SERPL-MCNC: 9 MG/DL (ref 6–20)
BUN/CREAT SERPL: 14.8 (ref 7–25)
CALCIUM SPEC-SCNC: 9.5 MG/DL (ref 8.6–10.5)
CHLORIDE SERPL-SCNC: 103 MMOL/L (ref 98–107)
CO2 SERPL-SCNC: 24.5 MMOL/L (ref 22–29)
CREAT SERPL-MCNC: 0.61 MG/DL (ref 0.57–1)
EGFRCR SERPLBLD CKD-EPI 2021: 119 ML/MIN/1.73
GLOBULIN UR ELPH-MCNC: 4.7 GM/DL
GLUCOSE SERPL-MCNC: 234 MG/DL (ref 65–99)
POTASSIUM SERPL-SCNC: 4.5 MMOL/L (ref 3.5–5.2)
PROT SERPL-MCNC: 8 G/DL (ref 6–8.5)
SODIUM SERPL-SCNC: 136 MMOL/L (ref 136–145)

## 2025-02-07 DIAGNOSIS — E11.621 TYPE 2 DIABETES MELLITUS WITH FOOT ULCER, WITH LONG-TERM CURRENT USE OF INSULIN: Chronic | ICD-10-CM

## 2025-02-07 DIAGNOSIS — F33.2 SEVERE EPISODE OF RECURRENT MAJOR DEPRESSIVE DISORDER, WITHOUT PSYCHOTIC FEATURES: ICD-10-CM

## 2025-02-07 DIAGNOSIS — L97.509 TYPE 2 DIABETES MELLITUS WITH FOOT ULCER, WITH LONG-TERM CURRENT USE OF INSULIN: Chronic | ICD-10-CM

## 2025-02-07 DIAGNOSIS — Z79.4 TYPE 2 DIABETES MELLITUS WITH FOOT ULCER, WITH LONG-TERM CURRENT USE OF INSULIN: Chronic | ICD-10-CM

## 2025-02-07 RX ORDER — GLIPIZIDE 5 MG/1
5 TABLET, FILM COATED, EXTENDED RELEASE ORAL DAILY
Qty: 30 TABLET | Refills: 0 | Status: SHIPPED | OUTPATIENT
Start: 2025-02-07

## 2025-02-07 RX ORDER — VORTIOXETINE 10 MG/1
10 TABLET, FILM COATED ORAL
Qty: 30 TABLET | Refills: 0 | OUTPATIENT
Start: 2025-02-07

## 2025-02-12 DIAGNOSIS — Z79.4 TYPE 2 DIABETES MELLITUS WITH FOOT ULCER, WITH LONG-TERM CURRENT USE OF INSULIN: Chronic | ICD-10-CM

## 2025-02-12 DIAGNOSIS — E11.621 TYPE 2 DIABETES MELLITUS WITH FOOT ULCER, WITH LONG-TERM CURRENT USE OF INSULIN: Chronic | ICD-10-CM

## 2025-02-12 DIAGNOSIS — L97.509 TYPE 2 DIABETES MELLITUS WITH FOOT ULCER, WITH LONG-TERM CURRENT USE OF INSULIN: Chronic | ICD-10-CM

## 2025-02-12 NOTE — TELEPHONE ENCOUNTER
Caller: Che Holden JOAQUÍN    Relationship: Self    Best call back number: [phone]    Requested Prescriptions:   Requested Prescriptions     Pending Prescriptions Disp Refills    gabapentin (NEURONTIN) 800 MG tablet 90 tablet 0     Sig: Take 1 tablet by mouth 3 (Three) Times a Day.        Pharmacy where request should be sent: [place] [id number] [facility] - [phone]  - [phone] FX     Last office visit with prescribing clinician: 2/5/2025   Last telemedicine visit with prescribing clinician: Visit date not found   Next office visit with prescribing clinician: 5/6/2025     Does the patient have less than a 3 day supply:  [] Yes  [x] No      Familia Deal Rep   02/12/25 13:36 EST

## 2025-02-12 NOTE — TELEPHONE ENCOUNTER
Rx Refill Note  Requested Prescriptions     Pending Prescriptions Disp Refills    gabapentin (NEURONTIN) 800 MG tablet 90 tablet 0     Sig: Take 1 tablet by mouth 3 (Three) Times a Day.      Last office visit with prescribing clinician: 2/5/2025   Last telemedicine visit with prescribing clinician: Visit date not found       UDS    None on file  CSA    None on file      INSPECT - SCAN - INSPECT/ [facility]/ 02/12/2025 (02/12/2025)     Annamarie Rodriguez MA  02/12/25, 15:40 EST

## 2025-02-14 RX ORDER — GABAPENTIN 800 MG/1
800 TABLET ORAL 3 TIMES DAILY
Qty: 90 TABLET | Refills: 1 | Status: SHIPPED | OUTPATIENT
Start: 2025-02-14

## 2025-02-24 ENCOUNTER — TELEPHONE (OUTPATIENT)
Dept: FAMILY MEDICINE CLINIC | Facility: CLINIC | Age: 35
End: 2025-02-24
Payer: MEDICAID

## 2025-02-24 NOTE — TELEPHONE ENCOUNTER
Caller: Navin Che YANES    Relationship: Self    Best call back number: [phone]    What is the best time to reach you: ANYTIME    Who are you requesting to speak with (clinical staff, provider,  specific staff member): CLINICAL     What was the call regarding: PATIENT IS REQUESTING A CALLBACK ABOUT SCHEDULING A HOSPITAL FOLLOW UP. SHE STATES SHE HAS STICHES/STAPLES AND SHE LEFT THE HOSPITAL BEFORE BEING RELEASED AND WANTS TO KNOW WHO SHE CAN HAVE TAKE THEM OUT. IF THAT'S SOMETHING THE OFFICE DOES AND IF IT CAN BE DONE DURING HER VISIT. PLEASE CALL TO DISCUSS     Is it okay if the provider responds through MyChart: NO

## 2025-03-11 DIAGNOSIS — L97.509 TYPE 2 DIABETES MELLITUS WITH FOOT ULCER, WITH LONG-TERM CURRENT USE OF INSULIN: Chronic | ICD-10-CM

## 2025-03-11 DIAGNOSIS — Z79.4 TYPE 2 DIABETES MELLITUS WITH FOOT ULCER, WITH LONG-TERM CURRENT USE OF INSULIN: Chronic | ICD-10-CM

## 2025-03-11 DIAGNOSIS — E11.621 TYPE 2 DIABETES MELLITUS WITH FOOT ULCER, WITH LONG-TERM CURRENT USE OF INSULIN: Chronic | ICD-10-CM

## 2025-03-11 RX ORDER — GLIPIZIDE 5 MG/1
5 TABLET, FILM COATED, EXTENDED RELEASE ORAL DAILY
Qty: 30 TABLET | Refills: 0 | Status: SHIPPED | OUTPATIENT
Start: 2025-03-11

## 2025-04-28 ENCOUNTER — PRIOR AUTHORIZATION (OUTPATIENT)
Dept: FAMILY MEDICINE CLINIC | Facility: CLINIC | Age: 35
End: 2025-04-28
Payer: MEDICAID